# Patient Record
Sex: FEMALE | Race: WHITE | URBAN - METROPOLITAN AREA
[De-identification: names, ages, dates, MRNs, and addresses within clinical notes are randomized per-mention and may not be internally consistent; named-entity substitution may affect disease eponyms.]

---

## 2021-05-07 ENCOUNTER — OFFICE VISIT (OUTPATIENT)
Dept: URBAN - METROPOLITAN AREA CLINIC 19 | Facility: CLINIC | Age: 41
End: 2021-05-07
Payer: SELF-PAY

## 2021-05-07 ENCOUNTER — WEB ENCOUNTER (OUTPATIENT)
Dept: URBAN - METROPOLITAN AREA CLINIC 19 | Facility: CLINIC | Age: 41
End: 2021-05-07

## 2021-05-07 DIAGNOSIS — R10.11 ABDOMINAL BURNING SENSATION IN RIGHT UPPER QUADRANT: ICD-10-CM

## 2021-05-07 DIAGNOSIS — R10.30 LOWER ABDOMINAL PAIN: ICD-10-CM

## 2021-05-07 LAB
ABSOLUTE NRBC COUNT: 0
AG RATIO: 1
ALBUMIN LEVEL: 4.1
ALK PHOS: 103
ALT: 14
ANION GAP: 10
AST: 14
BILIRUBIN TOTAL: 0.5
BUN/CREAT RATIO: 16
BUN: 11
CALCIUM LEVEL: 9.5
CHLORIDE LEVEL: 103
CO2 LEVEL: 27
CREATININE LEVEL: 0.7
CRP: 5.2
ESR WESTERGREN: 44
GFR AFRICAN AMERICAN: >60
GFR NON AFRICAN AMERICAN: >60
GLUCOSE LEVEL: 101
HCT: 42.5
HGB: 13.3
MCH: 27.4
MCHC: 31.3
MCV: 87.4
MPV: 9.3
NRBC AUTO: 0
OSMO (CALC): 279
PERFORMING LAB: (no result)
PLATELETS: 284
POTASSIUM LEVEL: 3.8
PROTEIN TOTAL: 7.6
RBC: 4.86
RDW: 14
SODIUM LEVEL: 140
WBC: 7.1

## 2021-05-07 PROCEDURE — 99243 OFF/OP CNSLTJ NEW/EST LOW 30: CPT | Performed by: INTERNAL MEDICINE

## 2021-05-07 PROCEDURE — 99203 OFFICE O/P NEW LOW 30 MIN: CPT | Performed by: INTERNAL MEDICINE

## 2021-05-07 RX ORDER — DICYCLOMINE HYDROCHLORIDE 10 MG/1
2 CAPSULES CAPSULE ORAL BID
Qty: 120 CAPSULE | Refills: 3 | OUTPATIENT
Start: 2021-05-07 | End: 2021-09-04

## 2021-05-07 RX ORDER — HYDROCHLOROTHIAZIDE 25 MG/1
1 TABLET IN THE MORNING TABLET ORAL ONCE A DAY
Status: ACTIVE | COMMUNITY

## 2021-05-07 RX ORDER — APIXABAN 5 MG/1
AS DIRECTED TABLET, FILM COATED ORAL
Status: ACTIVE | COMMUNITY

## 2021-05-07 NOTE — HPI-TODAY'S VISIT:
Patient presents as a referral from Dr. Alisa Macdonald for evaluation of abdominal pain.  A copy of my note will be sent to the referring provider.  The patient weighs 501.2 pounds and is not interested in bariatric surgery.  Her PCP has referred her to a dietitian.  She feels a dull ache in the right and left lower quadrants after having a bowel movement, but she is not constipated.  In fact, she has 3-4 soft bowel movements daily without blood.  She had an anal fissure vs. fistula surgery in 2007 - does not remember all the details.  She has chronic heartburn that responds to omeprazole.  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  She has asthma for which she takes inhalers, and she has CHARISMA but says that CPAP did not help.

## 2021-05-21 ENCOUNTER — OFFICE VISIT (OUTPATIENT)
Dept: URBAN - METROPOLITAN AREA CLINIC 19 | Facility: CLINIC | Age: 41
End: 2021-05-21

## 2021-05-25 ENCOUNTER — LAB OUTSIDE AN ENCOUNTER (OUTPATIENT)
Dept: URBAN - METROPOLITAN AREA CLINIC 19 | Facility: CLINIC | Age: 41
End: 2021-05-25

## 2021-07-27 ENCOUNTER — OFFICE VISIT (OUTPATIENT)
Dept: URBAN - METROPOLITAN AREA CLINIC 19 | Facility: CLINIC | Age: 41
End: 2021-07-27
Payer: SELF-PAY

## 2021-07-27 ENCOUNTER — DASHBOARD ENCOUNTERS (OUTPATIENT)
Age: 41
End: 2021-07-27

## 2021-07-27 DIAGNOSIS — R10.30 LOWER ABDOMINAL PAIN: ICD-10-CM

## 2021-07-27 DIAGNOSIS — K59.01 CONSTIPATION: ICD-10-CM

## 2021-07-27 DIAGNOSIS — R79.82 ELEVATED C-REACTIVE PROTEIN: ICD-10-CM

## 2021-07-27 DIAGNOSIS — Z83.79 FAMILY HISTORY OF CROHN'S DISEASE: ICD-10-CM

## 2021-07-27 DIAGNOSIS — R70.0 ELEVATED SED RATE: ICD-10-CM

## 2021-07-27 PROBLEM — 165468009 ERYTHROCYTE SEDIMENTATION RATE RAISED: Status: ACTIVE | Noted: 2021-07-27

## 2021-07-27 PROCEDURE — 99213 OFFICE O/P EST LOW 20 MIN: CPT | Performed by: INTERNAL MEDICINE

## 2021-07-27 RX ORDER — HYDROCHLOROTHIAZIDE 25 MG/1
1 TABLET IN THE MORNING TABLET ORAL ONCE A DAY
Status: ACTIVE | COMMUNITY

## 2021-07-27 RX ORDER — APIXABAN 5 MG/1
AS DIRECTED TABLET, FILM COATED ORAL
Status: ACTIVE | COMMUNITY

## 2021-07-27 RX ORDER — DICYCLOMINE HYDROCHLORIDE 10 MG/1
2 CAPSULES CAPSULE ORAL BID
Qty: 120 CAPSULE | Refills: 3 | Status: ACTIVE | COMMUNITY
Start: 2021-05-07 | End: 2021-09-04

## 2021-07-27 RX ORDER — SODIUM, POTASSIUM,MAG SULFATES 17.5-3.13G
254 ML SOLUTION, RECONSTITUTED, ORAL ORAL ONCE
Qty: 1 KIT | Refills: 0 | OUTPATIENT
Start: 2021-07-27 | End: 2021-07-28

## 2021-07-27 NOTE — HPI-TODAY'S VISIT:
5/7/21:  Patient presents as a referral from Dr. Alisa Macdonald for evaluation of abdominal pain.  A copy of my note will be sent to the referring provider.  The patient weighs 501.2 pounds and is not interested in bariatric surgery.  Her PCP has referred her to a dietitian.  She feels a dull ache in the right and left lower quadrants after having a bowel movement, but she is not constipated.  In fact, she has 3-4 soft bowel movements daily without blood.  She had an anal fissure vs. fistula surgery in 2007 - does not remember all the details.  She has chronic heartburn that responds to omeprazole.  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  She has asthma for which she takes inhalers, and she has CHARISMA but says that CPAP did not help.  7/27/21:  Patient returns for reevaluation of her GI issues.  She does feel somewhat better in regards to her lower abdominal pain while using the dicyclomine.  However, her ESR/CRP were elevated, and she tells me that her cousin had similar symptoms and was diagnosed with Crohn's disease.

## 2021-07-28 ENCOUNTER — TELEPHONE ENCOUNTER (OUTPATIENT)
Dept: URBAN - METROPOLITAN AREA CLINIC 19 | Facility: CLINIC | Age: 41
End: 2021-07-28

## 2021-08-04 ENCOUNTER — TELEPHONE ENCOUNTER (OUTPATIENT)
Dept: URBAN - METROPOLITAN AREA CLINIC 19 | Facility: CLINIC | Age: 41
End: 2021-08-04

## 2021-08-05 PROBLEM — 54586004 LOWER ABDOMINAL PAIN: Status: ACTIVE | Noted: 2021-05-07

## 2021-08-05 PROBLEM — 160386006 FAMILY HISTORY OF CROHN'S DISEASE: Status: ACTIVE | Noted: 2021-07-27

## 2021-08-05 PROBLEM — 14760008 CONSTIPATION: Status: ACTIVE | Noted: 2021-07-27

## 2021-08-05 PROBLEM — 119971000119104 ELEVATED C-REACTIVE PROTEIN: Status: ACTIVE | Noted: 2021-07-27

## 2021-09-24 ENCOUNTER — OFFICE VISIT (OUTPATIENT)
Dept: URBAN - METROPOLITAN AREA MEDICAL CENTER 25 | Facility: MEDICAL CENTER | Age: 41
End: 2021-09-24
Payer: SELF-PAY

## 2021-09-24 DIAGNOSIS — R79.82 C-REACTIVE PROTEIN ABNORMAL: ICD-10-CM

## 2021-09-24 DIAGNOSIS — R19.7 ACUTE DIARRHEA: ICD-10-CM

## 2021-09-24 DIAGNOSIS — R10.30 ABDOMINAL PAIN OF UNKNOWN CAUSE: ICD-10-CM

## 2021-09-24 PROCEDURE — 45380 COLONOSCOPY AND BIOPSY: CPT | Performed by: INTERNAL MEDICINE

## 2021-09-24 RX ORDER — APIXABAN 5 MG/1
AS DIRECTED TABLET, FILM COATED ORAL
Status: ACTIVE | COMMUNITY

## 2021-09-24 RX ORDER — HYDROCHLOROTHIAZIDE 25 MG/1
1 TABLET IN THE MORNING TABLET ORAL ONCE A DAY
Status: ACTIVE | COMMUNITY

## 2021-09-28 ENCOUNTER — ERX REFILL RESPONSE (OUTPATIENT)
Dept: URBAN - METROPOLITAN AREA CLINIC 19 | Facility: CLINIC | Age: 41
End: 2021-09-28

## 2021-09-28 ENCOUNTER — TELEPHONE ENCOUNTER (OUTPATIENT)
Dept: URBAN - METROPOLITAN AREA CLINIC 19 | Facility: CLINIC | Age: 41
End: 2021-09-28

## 2021-09-28 RX ORDER — DICYCLOMINE HYDROCHLORIDE 10 MG/1
TAKE 2 CAPSULES BY MOUTH TWICE DAILY CAPSULE ORAL
Qty: 120 CAPSULE | Refills: 6 | OUTPATIENT

## 2021-09-28 RX ORDER — DICYCLOMINE HYDROCHLORIDE 10 MG/1
TAKE 2 CAPSULES BY MOUTH TWICE DAILY CAPSULE ORAL
Qty: 120 CAPSULE | Refills: 6

## 2022-11-06 ENCOUNTER — ERX REFILL RESPONSE (OUTPATIENT)
Dept: URBAN - METROPOLITAN AREA CLINIC 19 | Facility: CLINIC | Age: 42
End: 2022-11-06

## 2022-11-06 RX ORDER — DICYCLOMINE HYDROCHLORIDE 10 MG/1
TAKE TWO CAPSULES BY MOUTH TWICE A DAY CAPSULE ORAL
Qty: 120 CAPSULE | Refills: 11 | OUTPATIENT

## 2022-11-06 RX ORDER — DICYCLOMINE HYDROCHLORIDE 10 MG/1
TAKE 2 CAPSULES BY MOUTH TWICE DAILY CAPSULE ORAL
Qty: 120 CAPSULE | Refills: 6 | OUTPATIENT

## 2023-11-06 ENCOUNTER — ERX REFILL RESPONSE (OUTPATIENT)
Dept: URBAN - METROPOLITAN AREA CLINIC 19 | Facility: CLINIC | Age: 43
End: 2023-11-06

## 2023-11-06 RX ORDER — DICYCLOMINE HYDROCHLORIDE 10 MG/1
TAKE TWO CAPSULES BY MOUTH TWICE A DAY CAPSULE ORAL
Qty: 120 CAPSULE | Refills: 11 | OUTPATIENT

## 2023-11-06 RX ORDER — DICYCLOMINE HYDROCHLORIDE 10 MG/1
TAKE 1-2 CAPSULES CAPSULE ORAL TWICE A DAY
Qty: 360 | Refills: 3 | OUTPATIENT

## 2024-11-17 ENCOUNTER — ERX REFILL RESPONSE (OUTPATIENT)
Dept: URBAN - METROPOLITAN AREA CLINIC 19 | Facility: CLINIC | Age: 44
End: 2024-11-17

## 2024-11-17 RX ORDER — DICYCLOMINE HYDROCHLORIDE 10 MG/1
TAKE 1-2 CAPSULES CAPSULE ORAL TWICE A DAY
Qty: 360 | Refills: 3 | OUTPATIENT